# Patient Record
Sex: MALE | Race: WHITE | Employment: OTHER | URBAN - METROPOLITAN AREA
[De-identification: names, ages, dates, MRNs, and addresses within clinical notes are randomized per-mention and may not be internally consistent; named-entity substitution may affect disease eponyms.]

---

## 2024-09-19 ENCOUNTER — HOSPITAL ENCOUNTER (OUTPATIENT)
Dept: GENERAL RADIOLOGY | Age: 64
Discharge: HOME | End: 2024-09-19
Payer: COMMERCIAL

## 2024-09-19 ENCOUNTER — OFFICE VISIT (OUTPATIENT)
Dept: ORTHOPEDIC SURGERY | Age: 64
End: 2024-09-19

## 2024-09-19 VITALS — BODY MASS INDEX: 30.21 KG/M2 | HEIGHT: 70 IN | WEIGHT: 211 LBS

## 2024-09-19 DIAGNOSIS — M25.562 LEFT KNEE PAIN, UNSPECIFIED CHRONICITY: ICD-10-CM

## 2024-09-19 DIAGNOSIS — M25.562 LEFT KNEE PAIN, UNSPECIFIED CHRONICITY: Primary | ICD-10-CM

## 2024-09-19 PROCEDURE — 73564 X-RAY EXAM KNEE 4 OR MORE: CPT

## 2024-09-19 PROCEDURE — 73562 X-RAY EXAM OF KNEE 3: CPT

## 2024-10-17 ENCOUNTER — TELEPHONE (OUTPATIENT)
Dept: ORTHOPEDIC SURGERY | Age: 64
End: 2024-10-17

## 2024-10-17 NOTE — TELEPHONE ENCOUNTER
General Question     Subject: LT KNEE MRI QUESTION/REQ A CALL BACK   Patient and /or Facility Request: Federico Foy   Contact Number: [phone]     PATIENT CALLED IN TO SEE IF HE CAN GET AN CALL BACK.. ABOUT MRI DENIED FOR HIS MEDICAL NEEDS AND CLINICAL NOTES...    PLEASE ADVISE

## 2024-10-17 NOTE — TELEPHONE ENCOUNTER
MRI was approved. Order faxed to [facility] for scheduling. Attempted to reach patient but someone picked the phone up and hung up twice.

## 2024-10-29 ENCOUNTER — TELEPHONE (OUTPATIENT)
Dept: ORTHOPEDIC SURGERY | Age: 64
End: 2024-10-29

## 2024-10-29 NOTE — TELEPHONE ENCOUNTER
Called and phone rang four times then disconnected.  Called back and phone rang until it turned to a busy signal.    MRI was approved. Auth scanned to media in chart. Can still have this done thru 11/18 at [facility].

## 2024-10-29 NOTE — TELEPHONE ENCOUNTER
General Question     Subject:MRI WAS DENIED BECAUSE THERE WASN'T ENOUGH INFORMATION.  Patient and /or Facility Request: Federico Foy   Contact Number: [phone]   REQUESTING A CALL BACK.